# Patient Record
Sex: FEMALE | Race: WHITE | NOT HISPANIC OR LATINO | Employment: STUDENT | URBAN - METROPOLITAN AREA
[De-identification: names, ages, dates, MRNs, and addresses within clinical notes are randomized per-mention and may not be internally consistent; named-entity substitution may affect disease eponyms.]

---
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
## 2020-04-14 ENCOUNTER — TELEMEDICINE (OUTPATIENT)
Dept: OBGYN CLINIC | Facility: MEDICAL CENTER | Age: 20
End: 2020-04-14
Payer: COMMERCIAL

## 2020-04-14 VITALS — WEIGHT: 220 LBS | HEIGHT: 66 IN | BODY MASS INDEX: 35.36 KG/M2

## 2020-04-14 DIAGNOSIS — R10.32 LEFT LOWER QUADRANT ABDOMINAL PAIN: ICD-10-CM

## 2020-04-14 DIAGNOSIS — K58.2 IRRITABLE BOWEL SYNDROME WITH BOTH CONSTIPATION AND DIARRHEA: Primary | ICD-10-CM

## 2020-04-14 PROCEDURE — 99213 OFFICE O/P EST LOW 20 MIN: CPT | Performed by: NURSE PRACTITIONER

## 2020-05-04 DIAGNOSIS — Z30.41 ENCOUNTER FOR SURVEILLANCE OF CONTRACEPTIVE PILLS: ICD-10-CM

## 2020-05-04 RX ORDER — DROSPIRENONE AND ETHINYL ESTRADIOL 0.02-3(28)
1 KIT ORAL DAILY
Qty: 90 TABLET | Refills: 3 | Status: SHIPPED | OUTPATIENT
Start: 2020-05-04 | End: 2020-07-09 | Stop reason: SDUPTHER

## 2020-07-09 ENCOUNTER — ANNUAL EXAM (OUTPATIENT)
Dept: OBGYN CLINIC | Facility: CLINIC | Age: 20
End: 2020-07-09
Payer: COMMERCIAL

## 2020-07-09 VITALS
SYSTOLIC BLOOD PRESSURE: 110 MMHG | WEIGHT: 239.6 LBS | BODY MASS INDEX: 38.67 KG/M2 | TEMPERATURE: 97.9 F | DIASTOLIC BLOOD PRESSURE: 70 MMHG

## 2020-07-09 DIAGNOSIS — Z30.41 ENCOUNTER FOR SURVEILLANCE OF CONTRACEPTIVE PILLS: ICD-10-CM

## 2020-07-09 DIAGNOSIS — Z11.3 SCREENING EXAMINATION FOR STD (SEXUALLY TRANSMITTED DISEASE): ICD-10-CM

## 2020-07-09 DIAGNOSIS — Z00.00 ENCOUNTER FOR ANNUAL PHYSICAL EXAMINATION EXCLUDING GYNECOLOGICAL EXAMINATION IN A PATIENT OLDER THAN 17 YEARS: Primary | ICD-10-CM

## 2020-07-09 PROCEDURE — S0612 ANNUAL GYNECOLOGICAL EXAMINA: HCPCS | Performed by: OBSTETRICS & GYNECOLOGY

## 2020-07-09 RX ORDER — IBUPROFEN 600 MG/1
TABLET ORAL
COMMUNITY
Start: 2020-05-07

## 2020-07-09 RX ORDER — DROSPIRENONE AND ETHINYL ESTRADIOL 0.02-3(28)
1 KIT ORAL DAILY
Qty: 90 TABLET | Refills: 3 | Status: SHIPPED | OUTPATIENT
Start: 2020-07-09 | End: 2021-03-15

## 2020-07-09 NOTE — PROGRESS NOTES
ASSESSMENT & PLAN: Garrick White is a 23 y o  Emerson Flock with normal gynecologic exam     1   Routine well woman exam done today  2  Pap:  The patient's last pap was never  Current ASCCP Guidelines reviewed  - aware PAP start at age 22   2  STD testing  was ordered   4  Gardasil recommendations reviewed  is vaccinated  5  The following were reviewed in today's visit: breast self exam, STD testing, use and side effects of OCPs, exercise and healthy diet    CC:  Annual Gynecologic Examination    HPI: Garrick White is a 23 y o  Emerson Flock who presents for annual gynecologic examination  She has the following concerns:  None     Health Maintenance:    She wears her seatbelt routinely  She does perform regular monthly self breast exams  She feels safe at home       Past Medical History:   Diagnosis Date    Acne     Bradycardia     last assessed: 10/26/2017    IBS (irritable bowel syndrome)     Mental status alteration     last assessed: 10/26/2017       Past Surgical History:   Procedure Laterality Date    WISDOM TOOTH EXTRACTION         OB/Gyn History:        OB History        0    Para   0    Term   0       0    AB   0    Living   0       SAB   0    TAB   0    Ectopic   0    Multiple   0    Live Births   0                 Family History   Problem Relation Age of Onset    Thyroid disease Paternal Grandmother     Prostate cancer Family         x2   [facility] Cancer Maternal Grandfather     Colon cancer Paternal Grandfather        Social History:  Social History     Socioeconomic History    Marital status: Single     Spouse name: Not on file    Number of children: Not on file    Years of education: HS grad  then [address] for [employer] and forensic    Highest education level: Not on file   Occupational History    Occupation: Student    Social Needs    Financial resource strain: Not on file    Food insecurity:     Worry: Not on file     Inability: Not on file    Transportation needs: Medical: Not on file     Non-medical: Not on file   Tobacco Use    Smoking status: Never Smoker    Smokeless tobacco: Never Used   Substance and Sexual Activity    Alcohol use: No    Drug use: No    Sexual activity: Yes     Partners: Male     Birth control/protection: OCP   Lifestyle    Physical activity:     Days per week: Not on file     Minutes per session: Not on file    Stress: Not on file   Relationships    Social connections:     Talks on phone: Not on file     Gets together: Not on file     Attends Nondenominational service: Not on file     Active member of club or organization: Not on file     Attends meetings of clubs or organizations: Not on file     Relationship status: Not on file    Intimate partner violence:     Fear of current or ex partner: Not on file     Emotionally abused: Not on file     Physically abused: Not on file     Forced sexual activity: Not on file   Other Topics Concern    Not on file   Social History Narrative    Inadequate exercise    Lives with parents    No preference on Nondenominational beliefs    Would accept blood         No Known Allergies      Current Outpatient Medications:     VITAMIN D PO, Take by mouth, Disp: , Rfl:     drospirenone-ethinyl estradiol (EDWIGE) 3-0 02 MG per tablet, Take 1 tablet by mouth daily, Disp: 90 tablet, Rfl: 3    ibuprofen (MOTRIN) 600 mg tablet, take 1 tablet by mouth every 6 hours or if needed for pain, Disp: , Rfl:     Multiple Vitamin (MULTIVITAMIN) tablet, Take 1 tablet by mouth daily, Disp: , Rfl:     Review of Systems:  Constitutional :no fever, feels well, no tiredness, no recent weight gain or loss  ENT: no ear ache, no loss of hearing, no nosebleeds or nasal discharge, no sore throat or hoarseness  Cardiovascular: no complaints of slow or fast heart beat, no chest pain, no palpitations, no leg claudication or lower extremity edema    Respiratory: no complaints of shortness of shortness of breath, no AMES  Breasts:no complaints of breast pain, breast lump, or nipple discharge  Gastrointestinal: no complaints of abdominal pain, constipation, nausea, vomiting, or diarrhea or bloody stools  Genitourinary : no complaints of dysuria, incontinence, pelvic pain, no dysmenorrhea, vaginal discharge or abnormal vaginal bleeding and as noted in HPI  Musculoskeletal: no complaints of arthralgia, no myalgia, no joint swelling or stiffness, no limb pain or swelling    Integumentary: no complaints of skin rash or lesion, itching or dry skin  Neurological: no complaints of headache, no confusion, no numbness or tingling, no dizziness or fainting    Objective      /70   Temp 97 9 °F (36 6 °C)   Wt 109 kg (239 lb 9 6 oz)   LMP 06/24/2020 (Exact Date)   Breastfeeding No   BMI 38 67 kg/m²     General:   appears stated age, cooperative, alert normal mood and affect   Lungs: Unlabored breathing    Breasts: Not indicated    Abdomen: soft, non-tender, without masses or organomegaly   Psychiatric orientation to person, place, and time: normal  mood and affect: normal

## 2021-03-10 DIAGNOSIS — Z23 ENCOUNTER FOR IMMUNIZATION: ICD-10-CM

## 2021-03-14 DIAGNOSIS — Z30.41 ENCOUNTER FOR SURVEILLANCE OF CONTRACEPTIVE PILLS: ICD-10-CM

## 2021-03-15 RX ORDER — DROSPIRENONE AND ETHINYL ESTRADIOL 0.02-3(28)
KIT ORAL
Qty: 84 TABLET | Refills: 3 | Status: SHIPPED | OUTPATIENT
Start: 2021-03-15